# Patient Record
Sex: FEMALE | Race: WHITE | URBAN - NONMETROPOLITAN AREA
[De-identification: names, ages, dates, MRNs, and addresses within clinical notes are randomized per-mention and may not be internally consistent; named-entity substitution may affect disease eponyms.]

---

## 2020-08-12 ENCOUNTER — OFFICE VISIT (OUTPATIENT)
Dept: ORTHOPEDIC SURGERY | Age: 50
End: 2020-08-12

## 2020-08-12 VITALS — BODY MASS INDEX: 24.8 KG/M2 | WEIGHT: 158 LBS | TEMPERATURE: 97.3 F | HEIGHT: 67 IN

## 2020-08-12 PROCEDURE — 99204 OFFICE O/P NEW MOD 45 MIN: CPT | Performed by: ORTHOPAEDIC SURGERY

## 2020-08-12 NOTE — PROGRESS NOTES
8/12/20  History of Present Illness:  Mariola Gates is a 48 y.o. female being seen for the first time for right hand numbness she gets numbness in the thumb index middle finger and the fifth and fourth digit also is been going on for several years but is gotten significantly worse in the last 6 months her whole arm goes numb from the shoulder to the fingers    Chief complaint that brought the patient in the office today: Right upper extremity numbness      Location right upper extremity  Severity severe  Duration 6 months it has gotten significantly worse  Associated sign/symptoms the whole arm goes numb    I have reviewed and discussed the below Pain assessment findings with the patient. Pain Assessment  Location of Pain: Arm  Location Modifiers: Right  Severity of Pain: 6  Quality of Pain: (numb)  Duration of Pain: A few hours  Frequency of Pain: Intermittent  Aggravating Factors: Bending  Limiting Behavior: Yes  Relieving Factors: Rest  Result of Injury: No  Work-Related Injury: No  Are there other pain locations you wish to document?: No    Medical History  Patient's medications, allergies, past medical, surgical, social and family histories were reviewed and updated as appropriate. No past medical history on file. Family History   Problem Relation Age of Onset    High Cholesterol Father      Social History     Socioeconomic History    Marital status:      Spouse name: None    Number of children: None    Years of education: None    Highest education level: None   Occupational History    None   Social Needs    Financial resource strain: None    Food insecurity     Worry: None     Inability: None    Transportation needs     Medical: None     Non-medical: None   Tobacco Use    Smoking status: Never Smoker    Smokeless tobacco: Never Used   Substance and Sexual Activity    Alcohol use:  Yes    Drug use: No    Sexual activity: None   Lifestyle    Physical activity     Days per week: None     Minutes per session: None    Stress: None   Relationships    Social connections     Talks on phone: None     Gets together: None     Attends Faith service: None     Active member of club or organization: None     Attends meetings of clubs or organizations: None     Relationship status: None    Intimate partner violence     Fear of current or ex partner: None     Emotionally abused: None     Physically abused: None     Forced sexual activity: None   Other Topics Concern    None   Social History Narrative    None     Current Outpatient Medications   Medication Sig Dispense Refill    thyroid (ARMOUR) 60 MG tablet Take 120 mg by mouth once      Progesterone 200 MG CAPS Take 400 mg by mouth      estrogens, conjugated, (PREMARIN) 0.3 MG tablet Take 0.3 mg by mouth daily      spironolactone (ALDACTONE) 100 MG tablet Take 100 mg by mouth daily      vitamin D 1000 UNITS CAPS Take by mouth      Nutritional Supplements (DHEA PO) Take by mouth      b complex vitamins capsule Take 1 capsule by mouth daily      Omega-3 Fatty Acids (FISH OIL PO) Take by mouth       No current facility-administered medications for this visit. No Known Allergies    REVIEW OF SYSTEMS:   No rashes today  No new numbness  No tingling  No fevers  No depression  No new onset of pain      Pertinent items are noted in HPI  Review of systems reviewed from Patient History Form dated on 8/12/2020 and available in the patient's chart under the Media tab. Examination:    Vitals: Temperature 97.3 °F (36.3 °C), height 5' 7\" (1.702 m), weight 158 lb (71.7 kg). ,   BMI Readings from Last 3 Encounters:   08/12/20 24.75 kg/m²         CERVICAL SPINE EXAMINATION:  · Inspection: Local inspection shows no step-off or bruising. Cervical alignment is normal. No instability is noted.   · Constitutional: Patient is adequately groomed with no evidence of malnutrition  · Lymphatic: The lymphatic examination bilaterally reveals all areas to be without enlargement or induration. · Mental Status: The patient is oriented to time, place and person. The patient's mood and   affect are appropriate. · Vascular: Examination reveals no swelling or calf tenderness. Peripheral pulses are palpable and 2+. · Palpation and Percussion: No evidence of tenderness at the midline. Paraspinal tenderness is not present. There is no paraspinal spasm. · Range of Motion:  She has good range of motion with her cervical spine but she gets a lot of crepitus but I cannot reproduce her symptomatology   · Strength: 5/5 bilateral upper extremities. · Special Tests:   Spurling's and Gimenez's are negative bilaterally. Pérez and Impingement tests are negative bilaterally. · Skin:There are no rashes, ulcerations or lesions. · Reflexes: Bilaterally triceps, biceps and brachioradialis are 2+. Clonus absent bilaterally at the feet. No pathological reflexes are noted. · Gait & station:  normal, patient ambulates without assistance and no ataxia    · Additional Examinations:    · RIGHT UPPER EXTREMITY:  Inspection/examination of the right upper extremity does not show any tenderness, deformity or injury. Range of motion is normal and pain-free. There is no gross instability. There are no rashes, ulcerations or lesions. Strength and tone are normal. No atrophy or abnormal movements are noted. · LEFT UPPER EXTREMITY: Inspection/examination of the left upper extremity does not show any tenderness, deformity or injury. Range of motion is normal and pain-free. There is no gross instability. There are no rashes, ulcerations or lesions. Strength and tone are normal. No atrophy or abnormal movements are noted.       Associated signs and symptoms:   Neurogenic bowel or bladder symptoms:  no   Perceived weakness:  yes   Difficulty walking:  no    Additional Comments:      Positive Tinel's at the elbow negative at the wrist positive indications for (possible future) interventions. After considering the various options discussed, Kamryn Kapoor elected to pursue a course of treatment that includes the followin. Medications: No further recommendations for new medications. 2. PT:  Prescribed home exercise program.    3. Further studies: Setup for Cervical MR without contrast to evaluate for soft tissue pathology or stenosis contributing to the neck pain and paresthesia. ,\"I will obtain an EMG to evaluate for paresthesias to rule out a nerve entrapment or a more proximal etiology. 4. Interventional:  {Blank single:18377::\"After further imaging is obtained, interventional options will be reviewed and     5. Healthy Lifestyle Measures:  Patient education material reviewing the following was distributed to [employer] lifestyle education  Back and neck pain educational information   Advanced imaging preparedness    Posture education   Weight Management discussed    For further information regarding the spine conditions and to review interventional treatments the patient was directed to [facility].    6.  Follow up:  after MRI    Kamryn Kapoor was instructed to call the office if her symptoms worsen or if new symptoms appear prior to the next scheduled visit. She is specifically instructed to contact the office between now & her scheduled appointment if she has concerns related to her condition or if she needs assistance in scheduling the above tests. She is   welcome to call for an appointment sooner if she has any additional concerns or questions. Cherri Mendoza D.O. [facility]  [facility] Trained  Board Certified  [employer] Team Physician      Disclaimer: \"This note was dictated with voice recognition software. Though review and correction are routine, we apologize for any errors. \"